# Patient Record
Sex: MALE | Race: WHITE
[De-identification: names, ages, dates, MRNs, and addresses within clinical notes are randomized per-mention and may not be internally consistent; named-entity substitution may affect disease eponyms.]

---

## 2022-08-16 ENCOUNTER — HOSPITAL ENCOUNTER (EMERGENCY)
Dept: HOSPITAL 94 - ER | Age: 37
LOS: 1 days | Discharge: HOME | End: 2022-08-17
Payer: MEDICAID

## 2022-08-16 VITALS — BODY MASS INDEX: 29.32 KG/M2 | HEIGHT: 70 IN | WEIGHT: 204.81 LBS

## 2022-08-16 DIAGNOSIS — S80.812A: Primary | ICD-10-CM

## 2022-08-16 DIAGNOSIS — Z79.899: ICD-10-CM

## 2022-08-16 DIAGNOSIS — F31.9: ICD-10-CM

## 2022-08-16 DIAGNOSIS — Z87.11: ICD-10-CM

## 2022-08-16 DIAGNOSIS — K21.9: ICD-10-CM

## 2022-08-16 DIAGNOSIS — Z98.890: ICD-10-CM
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
## 2022-08-16 DIAGNOSIS — Z88.0: ICD-10-CM

## 2022-08-16 DIAGNOSIS — F12.90: ICD-10-CM

## 2022-08-16 DIAGNOSIS — L03.116: ICD-10-CM

## 2022-08-16 DIAGNOSIS — Z90.89: ICD-10-CM
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
## 2022-08-16 DIAGNOSIS — F15.90: ICD-10-CM

## 2022-08-16 DIAGNOSIS — M79.662: ICD-10-CM

## 2022-08-16 DIAGNOSIS — Z20.3: ICD-10-CM

## 2022-08-16 DIAGNOSIS — Z79.2: ICD-10-CM
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
## 2022-08-16 DIAGNOSIS — F17.200: ICD-10-CM

## 2022-08-16 PROCEDURE — 99284 EMERGENCY DEPT VISIT MOD MDM: CPT

## 2022-08-16 PROCEDURE — 90715 TDAP VACCINE 7 YRS/> IM: CPT

## 2022-08-17 VITALS — DIASTOLIC BLOOD PRESSURE: 77 MMHG | SYSTOLIC BLOOD PRESSURE: 134 MMHG

## 2022-08-17 RX ADMIN — TETANUS TOXOID, REDUCED DIPHTHERIA TOXOID AND ACELLULAR PERTUSSIS VACCINE, ADSORBED ONE ML: 5; 2.5; 8; 8; 2.5 SUSPENSION INTRAMUSCULAR at 00:46

## 2022-08-17 RX ADMIN — SULFAMETHOXAZOLE AND TRIMETHOPRIM ONE TAB: 800; 160 TABLET ORAL at 00:46

## 2022-08-17 NOTE — NUR
PT CALLED STATING HE WAS SEEN LAST NIGHT BY DR LUIS AND LOST HIS WRITTEN 
RX FOR BACTRIM DS.  CONSULTED WITH SHANIQUE IQBAL NP AND PT'S RX WAS CALLED 
INTO [facility].

## 2022-12-14 ENCOUNTER — HOSPITAL ENCOUNTER (EMERGENCY)
Dept: HOSPITAL 94 - ER | Age: 37
Discharge: LEFT BEFORE DISCHARGE | End: 2022-12-14
Payer: MEDICAID

## 2022-12-14 DIAGNOSIS — R69: Primary | ICD-10-CM

## 2022-12-14 DIAGNOSIS — Z53.21: ICD-10-CM

## 2022-12-23 ENCOUNTER — HOSPITAL ENCOUNTER (EMERGENCY)
Dept: HOSPITAL 94 - ER | Age: 37
Discharge: LEFT BEFORE DISCHARGE | End: 2022-12-23
Payer: MEDICAID

## 2022-12-23 VITALS — DIASTOLIC BLOOD PRESSURE: 81 MMHG | SYSTOLIC BLOOD PRESSURE: 126 MMHG

## 2022-12-23 VITALS — HEIGHT: 71 IN | BODY MASS INDEX: 27.92 KG/M2 | WEIGHT: 199.41 LBS

## 2022-12-23 DIAGNOSIS — R45.851: Primary | ICD-10-CM

## 2022-12-23 DIAGNOSIS — Z53.21: ICD-10-CM

## 2022-12-23 DIAGNOSIS — Z20.822: ICD-10-CM

## 2022-12-23 LAB
ALBUMIN SERPL BCP-MCNC: 3.8 G/DL (ref 3.4–5)
ALBUMIN/GLOB SERPL: 1.1 {RATIO} (ref 1.1–1.5)
ALP SERPL-CCNC: 101 IU/L (ref 46–116)
ALT SERPL W P-5'-P-CCNC: 27 U/L (ref 12–78)
AMPHETAMINES UR QL SCN: POSITIVE
ANION GAP SERPL CALCULATED.3IONS-SCNC: 9 MMOL/L (ref 8–16)
AST SERPL W P-5'-P-CCNC: 30 U/L (ref 10–37)
BARBITURATES UR QL SCN: NEGATIVE
BASOPHILS # BLD AUTO: 0.1 X10'3 (ref 0–0.2)
BASOPHILS NFR BLD AUTO: 1 % (ref 0–1)
BENZODIAZ UR QL SCN: NEGATIVE
BILIRUB SERPL-MCNC: 0.2 MG/DL (ref 0.1–1)
BUN SERPL-MCNC: 9 MG/DL (ref 7–18)
BUN/CREAT SERPL: 9.8 (ref 5.4–32)
BZE UR QL SCN: NEGATIVE
CALCIUM SERPL-MCNC: 9.1 MG/DL (ref 8.5–10.1)
CANNABINOIDS UR QL SCN: POSITIVE
CHLORIDE SERPL-SCNC: 104 MMOL/L (ref 99–107)
CLARITY UR: CLEAR
CO2 SERPL-SCNC: 26.5 MMOL/L (ref 24–32)
COLOR UR: YELLOW
CREAT SERPL-MCNC: 0.92 MG/DL (ref 0.6–1.1)
EOSINOPHIL # BLD AUTO: 0.4 X10'3 (ref 0–0.9)
EOSINOPHIL NFR BLD AUTO: 3.7 % (ref 0–6)
ERYTHROCYTE [DISTWIDTH] IN BLOOD BY AUTOMATED COUNT: 14.2 % (ref 11.5–14.5)
ETHANOL SERPL-MCNC: 0.03 GM/DL (ref 0–0.01)
GFR SERPL CREATININE-BSD FRML MDRD: > 90 ML/MIN
GLUCOSE SERPL-MCNC: 99 MG/DL (ref 70–104)
GLUCOSE UR STRIP-MCNC: NEGATIVE MG/DL
HCT VFR BLD AUTO: 39.4 % (ref 42–52)
HGB BLD-MCNC: 13.2 G/DL (ref 14–17.9)
HGB UR QL STRIP: NEGATIVE
KETONES UR STRIP-MCNC: NEGATIVE MG/DL
LEUKOCYTE ESTERASE UR QL STRIP: NEGATIVE
LYMPHOCYTES # BLD AUTO: 3.4 X10'3 (ref 1.1–4.8)
LYMPHOCYTES NFR BLD AUTO: 32.2 % (ref 21–51)
MCH RBC QN AUTO: 30 PG (ref 27–31)
MCHC RBC AUTO-ENTMCNC: 33.6 G/DL (ref 33–36.5)
MCV RBC AUTO: 89.3 FL (ref 78–98)
METHADONE UR QL SCN: NEGATIVE
MONOCYTES # BLD AUTO: 0.8 X10'3 (ref 0–0.9)
MONOCYTES NFR BLD AUTO: 8.1 % (ref 2–12)
NEUTROPHILS # BLD AUTO: 5.7 X10'3 (ref 1.8–7.7)
NEUTROPHILS NFR BLD AUTO: 55 % (ref 42–75)
NITRITE UR QL STRIP: NEGATIVE
OPIATES UR QL SCN: NEGATIVE
PCP UR QL SCN: NEGATIVE
PH UR STRIP: 6.5 [PH] (ref 4.8–8)
PLATELET # BLD AUTO: 198 X10'3 (ref 140–440)
PMV BLD AUTO: 10 FL (ref 7.4–10.4)
POTASSIUM SERPL-SCNC: 3.7 MMOL/L (ref 3.5–5.1)
PROT SERPL-MCNC: 7.2 G/DL (ref 6.4–8.2)
PROT UR QL STRIP: NEGATIVE MG/DL
RBC # BLD AUTO: 4.42 X10'6 (ref 4.7–6.1)
SODIUM SERPL-SCNC: 139 MMOL/L (ref 135–145)
SP GR UR STRIP: <=1.005 (ref 1–1.03)
URN COLLECT METHOD CLASS: (no result)
UROBILINOGEN UR STRIP-MCNC: 0.2 E.U/DL (ref 0.2–1)
WBC # BLD AUTO: 10.4 X10'3 (ref 4.5–11)

## 2022-12-23 PROCEDURE — 85025 COMPLETE CBC W/AUTO DIFF WBC: CPT

## 2022-12-23 PROCEDURE — 80305 DRUG TEST PRSMV DIR OPT OBS: CPT

## 2022-12-23 PROCEDURE — 80320 DRUG SCREEN QUANTALCOHOLS: CPT

## 2022-12-23 PROCEDURE — 87811 SARS-COV-2 COVID19 W/OPTIC: CPT

## 2022-12-23 PROCEDURE — 36415 COLL VENOUS BLD VENIPUNCTURE: CPT

## 2022-12-23 PROCEDURE — 81003 URINALYSIS AUTO W/O SCOPE: CPT

## 2022-12-23 PROCEDURE — 80053 COMPREHEN METABOLIC PANEL: CPT
